# Patient Record
Sex: FEMALE | Race: WHITE
[De-identification: names, ages, dates, MRNs, and addresses within clinical notes are randomized per-mention and may not be internally consistent; named-entity substitution may affect disease eponyms.]

---

## 2017-12-07 ENCOUNTER — HOSPITAL ENCOUNTER (EMERGENCY)
Dept: HOSPITAL 58 - ED | Age: 27
Discharge: HOME | End: 2017-12-07

## 2017-12-07 VITALS — SYSTOLIC BLOOD PRESSURE: 170 MMHG | TEMPERATURE: 98.1 F | DIASTOLIC BLOOD PRESSURE: 98 MMHG

## 2017-12-07 VITALS — BODY MASS INDEX: 30.9 KG/M2

## 2017-12-07 DIAGNOSIS — K29.60: Primary | ICD-10-CM

## 2017-12-07 DIAGNOSIS — F17.210: ICD-10-CM

## 2017-12-07 LAB
ADD URINE MICROSCOPIC: NO
ALBUMIN SERPL-MCNC: 3.4 G/DL (ref 3.4–5)
ALBUMIN/GLOB SERPL: 1.06 {RATIO}
ALP SERPL-CCNC: 65 U/L (ref 42–98)
ALT SERPL-CCNC: 36 U/L (ref 12–78)
AMYLASE SERPL-CCNC: 54 U/L (ref 25–115)
ANION GAP SERPL CALC-SCNC: 15.1 MMOL/L
AST SERPL-CCNC: 23 U/L (ref 15–37)
BASOPHILS # BLD AUTO: 0.1 K/UL (ref 0–0.2)
BASOPHILS NFR BLD AUTO: 0.7 % (ref 0–3)
BILIRUB SERPL-MCNC: 0.2 MG/DL (ref 0–1.2)
BUN SERPL-MCNC: 13 MG/DL (ref 7–18)
BUN/CREAT SERPL: 16.04
CALCIUM SERPL-MCNC: 8.8 MG/DL (ref 8.2–10.2)
CHLORIDE SERPL-SCNC: 108 MMOL/L (ref 98–107)
CO2 BLD-SCNC: 21 MMOL/L (ref 21–32)
CREAT SERPL-MCNC: 0.81 MG/DL (ref 0.6–1.3)
EOSINOPHIL # BLD AUTO: 0.6 K/UL (ref 0–0.7)
EOSINOPHIL NFR BLD AUTO: 6.5 % (ref 0–7)
GFR SERPLBLD BASED ON 1.73 SQ M-ARVRAT: 84 ML/MIN
GLOBULIN SER CALC-MCNC: 3.2 G/L
GLUCOSE SERPL-MCNC: 95 MG/DL (ref 70–110)
H PYLORI IGG SERPL QL IA: NEGATIVE
H.PYLORI INTERNAL QC: (no result)
HCT VFR BLD AUTO: 37.8 % (ref 37–47)
HGB BLD-MCNC: 12.9 G/DL (ref 12–16)
IMM GRANULOCYTES NFR BLD AUTO: 0.2 % (ref 0–5)
LIPASE SERPL-CCNC: 29 U/L (ref 8–78)
LYMPHOCYTES # SPEC AUTO: 1.9 K/UL (ref 0.6–3.4)
LYMPHOCYTES NFR BLD AUTO: 21.2 % (ref 10–50)
MCH RBC QN: 28.5 PG (ref 27–31)
MCHC RBC AUTO-ENTMCNC: 34.1 G/DL (ref 31.8–35.4)
MCV RBC: 83.6 FL (ref 81–99)
MONOCYTES # BLD AUTO: 0.7 K/UL (ref 0.4–2)
MONOCYTES NFR BLD AUTO: 8.1 % (ref 0–10)
NEUTROPHILS # BLD AUTO: 5.8 K/UL (ref 2–6.9)
NEUTROPHILS NFR BLD AUTO: 63.3 %
PH UR: 6 [PH] (ref 5–9)
PLATELET # BLD AUTO: 266 10^3/UL (ref 140–440)
POTASSIUM SERPL-SCNC: 4.1 MMOL/L (ref 3.5–5.1)
PROT SERPL-MCNC: 6.6 G/DL (ref 6.4–8.2)
RBC # BLD AUTO: 4.52 10^6/UL (ref 4.2–5.4)
SERUM PREGNANCY INTERNAL QC: (no result)
SODIUM SERPL-SCNC: 140 MMOL/L (ref 136–145)
SP GR UR: 1.01 (ref 1–1.03)
WBC # BLD AUTO: 9.14 K/UL (ref 4.6–10.2)

## 2017-12-07 PROCEDURE — 99283 EMERGENCY DEPT VISIT LOW MDM: CPT

## 2017-12-07 PROCEDURE — 83690 ASSAY OF LIPASE: CPT

## 2017-12-07 PROCEDURE — 81001 URINALYSIS AUTO W/SCOPE: CPT

## 2017-12-07 PROCEDURE — 80053 COMPREHEN METABOLIC PANEL: CPT

## 2017-12-07 PROCEDURE — 86677 HELICOBACTER PYLORI ANTIBODY: CPT

## 2017-12-07 PROCEDURE — 82150 ASSAY OF AMYLASE: CPT

## 2017-12-07 PROCEDURE — 36415 COLL VENOUS BLD VENIPUNCTURE: CPT

## 2017-12-07 PROCEDURE — 84703 CHORIONIC GONADOTROPIN ASSAY: CPT

## 2017-12-07 PROCEDURE — 85025 COMPLETE CBC W/AUTO DIFF WBC: CPT

## 2017-12-07 NOTE — CT
EXAM:  CT of the abdomen and pelvis without contrast. 

  

HISTORY:  Left upper quadrant abdominal pain. 

  

PROCEDURE:  Contiguous axial CT images of the abdomen and pelvis without contrast with coronal and sa
gittal reformats. 

  

FINDINGS: The liver, gallbladder, pancreas, spleen, adrenal glands and kidneys are normal in appearan
ce. The abdominal aorta is normal in appearance. The visualized loops of bowel and appendix are sarthak
l in appearance.  No free fluid or free air in the abdomen or pelvis. The bladder is adequately fille
d with no abnormality identified. The uterus is unremarkable.  There is a 2.2 cm fluid density cyst i
n the right adnexa. The bones and soft tissues are unremarkable. 

  

Impression: 2.2 cm simple right adnexal cyst.

## 2017-12-07 NOTE — ED.PDOC
General


Stated Complaint: PATIENT STATES THAT SHE HAS HAD ABDOMINAL PAIN IN HER LEFT 

LOWER ABDOMEN. PATIENT REPORTS THAT SHE FEELS THAT THE PAIN IS WORSENING[End]

98.1 101 18 98% 170/98 7/10


Time Seen by Physician: 18:20


Mode of Arrival: Walk-In


Information Source: Patient


Exam Limitations: No limitations


Nursing and Triage Documentation Reviewed and Agree: No





<HUMAILE SARIKA DEGROOT - Last Filed: 17 19:15>





<MOSES WEBER - Last Filed: 17 20:16>


ED Provider: 


Dr. MOSES WBEER





Chief Complaint: Abdominal Pain


Primary Care Provider: 


NAZ BARRIOS








Review of Systems





- Review Of Systems


Constitutional: Reports: No symptoms


Eyes: Reports: No symptoms


Ears, Nose, Mouth, Throat: Reports: No symptoms


Respiratory: Reports: No symptoms


Cardiac: Reports: No symptoms


GI: Reports: No symptoms


: Reports: Burning, Pain (stabbing left upper quadrant consistent with 

stomach pain), Other (guarding)


Musculoskeletal: Reports: No symptoms


Skin: Reports: No symptoms


Neurological: Reports: No symptoms


Endocrine: Reports: No symptoms


Hematologic/Lymphatic: Reports: No symptoms


All Other Systems: Other





<MAILE LUI JR - Last Filed: 17 19:15>





Past Medical History





- Past Medical History


Previously Healthy: Yes


Endocrine: Reports: None


Cardiovascular: Reports: None


Respiratory: Reports: None


Hematological: Reports: None


Gastrointestinal: Reports: None


Genitourinary: Reports: None


Neuro/Psych: Reports: None


Musculoskeletal: Reports: None


Cancer: Reports: None


Last Menstrual Period: 12/15/17





- Surgical History


General Surgical History: Reports: , Unknown





- Family History


Family History: Reports: Unknown





- Social History


Smoking Status: Current every day smoker


Hx Substance Use: No


Alcohol Screening: None





- Immunizations


Tetanus Shot up to Date: No





<MAILE LUI JR - Last Filed: 17 19:15>





Physical Exam





- Physical Exam


Appearance: Well-appearing, Obese


Pain Distress: Moderate


Eyes: BARBER, EOMI, Conjunctiva clear


ENT: Ears normal, Nose normal, Oropharynx normal


Neck: Supple


Respiratory: Airway patent, Breath sounds clear, Breath sounds equal, 

Respirations nonlabored


Cardiovascular: RRR, Pulses normal, No rub, No murmur


GI/: Soft, No masses, Bowel sounds normal, No Organomegaly, Tender


Musculoskeletal: Normal strength, ROM intact, No edema, No calf tenderness


Skin: Warm, Dry, Normal color


Neurological: Sensation intact, Motor intact, Reflexes intact, Cranial nerves 

intact, Alert, Oriented


Psychiatric: Affect appropriate, Mood appropriate





<LUIMAILE SARIKA DEGROOT - Last Filed: 17 19:15>





Physician Notification





- Case Discussed


Endorsed To/Discussed With: DR WEBER


Time of Discussion: 19:09 (LABS PENDING)





<MAILE LUI JR - Last Filed: 17 19:15>





Critical Care Note





- Critical Care Note


Total Time (mins): 0





<MAILE LUI JR - Last Filed: 17 19:15>





Course





- Course


Hematology/Chemistry: 


 17 18:35





 17 18:35





<MAILE LUI JR - Last Filed: 17 19:15>





- Course


Hematology/Chemistry: 


 17 18:35





 17 18:35





<MOSES WEBER - Last Filed: 17 20:16>





- Course


Orders, Labs, Meds: 


Lab Review











  17





  18:35 18:35 18:35


 


WBC  9.14  


 


RBC  4.52  


 


Hgb  12.9  


 


Hct  37.8  


 


MCV  83.6  


 


MCH  28.5  


 


MCHC  34.1  


 


RDW Coeff of Chao  14.2  


 


Plt Count  266  


 


Immature Gran % (Auto)  0.2  


 


Neut % (Auto)  63.3  


 


Lymph % (Auto)  21.2  


 


Mono % (Auto)  8.1  


 


Eos % (Auto)  6.5  


 


Baso % (Auto)  0.7  


 


Immature Gran # (Auto)  0.0  


 


Neut #  5.8  


 


Lymph #  1.9  


 


Mono #  0.7  


 


Eos #  0.6  


 


Baso #  0.1  


 


Sodium   140 


 


Potassium   4.1 


 


Chloride   108 H 


 


Carbon Dioxide   21 


 


Anion Gap   15.1 


 


BUN   13 


 


Creatinine   0.81 


 


Estimated GFR (MDRD)   84.00 


 


BUN/Creatinine Ratio   16.04 


 


Glucose   95 


 


Calcium   8.8 


 


Total Bilirubin   0.20 


 


AST   23 


 


ALT   36 


 


Alkaline Phosphatase   65 


 


Total Protein   6.6 


 


Albumin   3.4 


 


Globulin   3.2 


 


Albumin/Globulin Ratio   1.06 


 


Amylase   54 


 


Lipase   29 


 


Serum Pregnancy, Qual   


 


Urine Color   


 


Urine Clarity   


 


Urine pH   


 


Ur Specific Gravity   


 


Urine Protein   


 


Urine Glucose (UA)   


 


Urine Ketones   


 


Urine Blood   


 


Urine Nitrite   


 


Urine Bilirubin   


 


Urine Urobilinogen   


 


Ur Leukocyte Esterase   


 


H. pylori IgG Antibody    Negative














  17





  18:35 18:40


 


WBC  


 


RBC  


 


Hgb  


 


Hct  


 


MCV  


 


MCH  


 


MCHC  


 


RDW Coeff of Chao  


 


Plt Count  


 


Immature Gran % (Auto)  


 


Neut % (Auto)  


 


Lymph % (Auto)  


 


Mono % (Auto)  


 


Eos % (Auto)  


 


Baso % (Auto)  


 


Immature Gran # (Auto)  


 


Neut #  


 


Lymph #  


 


Mono #  


 


Eos #  


 


Baso #  


 


Sodium  


 


Potassium  


 


Chloride  


 


Carbon Dioxide  


 


Anion Gap  


 


BUN  


 


Creatinine  


 


Estimated GFR (MDRD)  


 


BUN/Creatinine Ratio  


 


Glucose  


 


Calcium  


 


Total Bilirubin  


 


AST  


 


ALT  


 


Alkaline Phosphatase  


 


Total Protein  


 


Albumin  


 


Globulin  


 


Albumin/Globulin Ratio  


 


Amylase  


 


Lipase  


 


Serum Pregnancy, Qual  Negative 


 


Urine Color   Yellow


 


Urine Clarity   Clear


 


Urine pH   6.0


 


Ur Specific Gravity   1.015


 


Urine Protein   Negative


 


Urine Glucose (UA)   Negative


 


Urine Ketones   Negative


 


Urine Blood   Negative


 


Urine Nitrite   Negative


 


Urine Bilirubin   Negative


 


Urine Urobilinogen   0.2


 


Ur Leukocyte Esterase   Negative


 


H. pylori IgG Antibody  








Orders











 Category Date Time Status


 


 AMYLASE Stat LAB  17 18:35 Completed


 


 CBC W/ AUTO DIFF Stat LAB  17 18:35 Completed


 


 COMPREHENSIVE METABOLIC PANEL Stat LAB  17 18:35 Completed


 


 H. PYLORI SCREEN Stat LAB  17 18:35 Completed


 


 LIPASE Stat LAB  17 18:35 Completed


 


 PREGNANCY SERUM TEST [SERUM PREGNANCY] Stat LAB  17 18:35 Completed


 


 URINALYSIS C & S IF INDICATED Stat LAB  17 18:40 Completed


 


 Pantoprazole Sodium [Protonix] MEDS  17 18:59 Discontinued





 40 mg PO ONCE STA   


 


 CT ABDOMEN/PELVIS WO CONTRAST Stat RADS  17 18:24 Completed








Medications














Discontinued Medications














Generic Name Dose Route Start Last Admin





  Trade Name Freq  PRN Reason Stop Dose Admin


 


Pantoprazole Sodium  40 mg  17 18:59  17 19:07





  Protonix  PO  17 19:00  40 mg





  ONCE STA   Administration











Vital Signs: 


 











  Temp Pulse Resp BP Pulse Ox


 


 17 17:53  98.1 F  101 H  18  170/98 H  98














Departure





<MAILE LUI JR - Last Filed: 17 19:15>





- Departure


Time of Disposition: 19:36


Pt referred to PMD for follow-up: Yes





<MOSES WEBER - Last Filed: 17 20:16>





- Departure


Disposition: HOME SELF-CARE


Discharge Problem: 


 Peptic gastritis





Instructions:  Gastritis (ED)


Condition: Fair


Additional Instructions: 


follow up with PMD 1 week discuss symptoms and protonix


return if worse


protonix for 4 weeks if OK with PMD discuss other adjuncts such as carafate and 

maalox with your physician


Return to do an US and HIDA of your gall bladder in the morning. 


Prescriptions: 


Pantoprazole Sodium [Protonix] 40 mg PO QDAC #30 tablet.


Allergies/Adverse Reactions: 


Allergies





No Known Allergies Allergy (Verified 17 17:58)


 








Home Medications: 


Ambulatory Orders





Citalopram Hydrobromide [Celexa] 40 mg PO DAILY 14 


Clonazepam [Klonopin] 0.5 mg PO BID 16 


Pantoprazole Sodium [Protonix] 40 mg PO QDAC #30 tablet. 17

## 2018-02-01 ENCOUNTER — HOSPITAL ENCOUNTER (EMERGENCY)
Dept: HOSPITAL 58 - ED | Age: 28
Discharge: HOME | End: 2018-02-01

## 2018-02-01 VITALS — DIASTOLIC BLOOD PRESSURE: 79 MMHG | TEMPERATURE: 97.7 F | SYSTOLIC BLOOD PRESSURE: 128 MMHG

## 2018-02-01 VITALS — BODY MASS INDEX: 31.7 KG/M2
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
## 2018-02-01 DIAGNOSIS — S60.00XA: ICD-10-CM

## 2018-02-01 DIAGNOSIS — S60.032A: Primary | ICD-10-CM

## 2018-02-01 DIAGNOSIS — F17.210: ICD-10-CM

## 2018-02-01 DIAGNOSIS — S60.222A: ICD-10-CM

## 2018-02-01 PROCEDURE — 99283 EMERGENCY DEPT VISIT LOW MDM: CPT
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
## 2018-02-01 NOTE — DI
Exam:  Three x-rays of the left hand. 

  

Comparison:  None available. 

  

Reason for exam:  Hand injury.  Blunt trauma. 

  

FINDINGS:  No acute fracture or malalignment.  The joint spaces are well maintained.  No unexplained 
calcific soft tissue density or radiopaque retained foreign body. 

  

Impression:  No acute fracture or malalignment is seen in the left hand.

## 2019-08-28 ENCOUNTER — HOSPITAL ENCOUNTER (EMERGENCY)
Dept: HOSPITAL 58 - ED | Age: 29
Discharge: LEFT BEFORE DISCHARGE | End: 2019-08-28

## 2019-08-28 VITALS — BODY MASS INDEX: 25.6 KG/M2

## 2019-08-28 VITALS — SYSTOLIC BLOOD PRESSURE: 129 MMHG | TEMPERATURE: 99.2 F | DIASTOLIC BLOOD PRESSURE: 86 MMHG

## 2019-08-28 DIAGNOSIS — N39.0: Primary | ICD-10-CM

## 2019-08-28 DIAGNOSIS — F17.210: ICD-10-CM

## 2019-08-28 DIAGNOSIS — R10.84: ICD-10-CM

## 2019-08-28 PROCEDURE — 93010 ELECTROCARDIOGRAM REPORT: CPT

## 2019-08-28 PROCEDURE — 80053 COMPREHEN METABOLIC PANEL: CPT

## 2019-08-28 PROCEDURE — 87502 INFLUENZA DNA AMP PROBE: CPT

## 2019-08-28 PROCEDURE — 81001 URINALYSIS AUTO W/SCOPE: CPT

## 2019-08-28 PROCEDURE — 87086 URINE CULTURE/COLONY COUNT: CPT

## 2019-08-28 PROCEDURE — 93005 ELECTROCARDIOGRAM TRACING: CPT

## 2019-08-28 PROCEDURE — 87651 STREP A DNA AMP PROBE: CPT

## 2019-08-28 PROCEDURE — 96372 THER/PROPH/DIAG INJ SC/IM: CPT

## 2019-08-28 PROCEDURE — 85025 COMPLETE CBC W/AUTO DIFF WBC: CPT

## 2019-08-28 PROCEDURE — 36415 COLL VENOUS BLD VENIPUNCTURE: CPT

## 2019-08-28 PROCEDURE — 99284 EMERGENCY DEPT VISIT MOD MDM: CPT

## 2019-08-28 PROCEDURE — 85008 BL SMEAR W/O DIFF WBC COUNT: CPT

## 2019-08-28 PROCEDURE — 84703 CHORIONIC GONADOTROPIN ASSAY: CPT

## 2019-08-28 NOTE — CT
Exam:  CT abdomen pelvis without intravenous contrast. 

  

Comparison:  12/07/2017. 

  

Reason for exam:  Pain. 

  

  

FINDINGS:  No pleural effusion, or focal consolidation in the partially imaged lung bases. 

  

The spleen, adrenal glands, gallbladder, and pancreas appear grossly unremarkable within limitations 
of a noncontrasted study. 

  

No hydronephrosis, hydroureter or nephrolithiasis in either kidney. 

  

The appendix appears unremarkable. 

  

No evidence of bowel obstruction. 

  

No intra-abdominal free air or pelvic free fluid. 

  

The bladder appears grossly unremarkable. 

  

No suspicious appearing osteoblastic or osteolytic lesion. 

  

Impression: 

  

No acute inflammatory findings are seen within the abdomen or pelvis.

## 2019-08-28 NOTE — DI
EXAM:  Two views of the chest. 

  

History:  Cough. 

  

Findings:  Heart size is normal.  Central peribronchial cuffing.  No appreciable pleural fluid and no
 pneumothorax.  No acute osseous abnormalities. 

  

Impression:  Central peribronchial wall thickening but no consolidated pneumonia.

## 2021-11-22 NOTE — ED.PDOC
General


ED Provider: 


Dr. JITENDRA PACE





Chief Complaint: Fever


Stated Complaint: fever , cough abdominal pain nausea, vomiting weakness


Time Seen by Physician: 09:00 ([facility]UIZ AND NURSES )


Mode of Arrival: Walk-In


Information Source: Patient


Exam Limitations: No limitations


Primary Care Provider: 


NAZ BARRIOS





Nursing and Triage Documentation Reviewed and Agree: Yes


Does patient meet sepsis criteria?: No


System Inflammatory Response Syndrome: Pulse >90 BPM, Not Applicable


Sepsis Protocol: 


For patient's 13 years and over:





Temp is 96.8 and below  and greater


Pulse >90 BPM


Resp >20/minute


Acutely Altered Mental Status





Are patient's symptoms suggestive of a new infection, such as:


   -Pneumonia


   -Skin, Soft Tissue


   -Endocarditis


   -UTI


   -Bone, Joint Infection


   -Implantable Device


   -Acute Abdominal Infection


   -Wound Infection


   -Meningitis


   -Blood Stream Catheter Infection


   -Unknown








GI Complaint Exam





- Abdominal Pain Complaint/Exam


Onset: Gradual


Duration: 2 DAYS


Symptoms Are: Resolved


Timing: Intermittent


Initial Severity: Mild


Current Severity: Mild


Location of Pain: Diffuse


Character: Reports: Dull


Aggravating: Reports: None


Alleviating: Reports: None


Associated Signs and Symptoms: Reports: Fever, Cough, Dysuria.  Denies: 

Diaphoresis, Chest pain, Dizziness, Back pain, Constipation, Blood in stool, 

Urinary frequency, Decreased urine output, Decreased appetite, Vaginal bleeding

, Vaginal discharge, Nausea, Vomiting, Diarrhea, Sore throat, Decreased activity


AAA Risk Factors: Reports: None


Cardiac Risk Factors: Reports: None


Ectopic Pregnancy Risk Factors: Reports: None


Ovarian Torsion Risk Factors: Reports: None


Surgical Obstruction Risk Factors: Reports: None


Related Surgical History: Reports: None


Patient Rh Status: Unknown


Abdominal Findings: Present: None


Differential Diagnoses: Appendicitis, Bowel Obstruction, Constipation, 

Gastroenteritis, Pancreatitis, Ureteral Stone, UTI





Review of Systems





- Review Of Systems


Constitutional: Reports: No symptoms


Eyes: Reports: No symptoms


Ears, Nose, Mouth, Throat: Reports: No symptoms


Respiratory: Reports: No symptoms


Cardiac: Reports: No symptoms


GI: Reports: Abdominal pain, Nausea, Poor appetite


: Reports: No symptoms


Musculoskeletal: Reports: No symptoms


Skin: Reports: No symptoms


Neurological: Reports: No symptoms


Endocrine: Reports: No symptoms


Hematologic/Lymphatic: Reports: No symptoms


All Other Systems: Reviewed and Negative





Past Medical History





- Past Medical History


Previously Healthy: Yes


Endocrine: Reports: None


Cardiovascular: Reports: None


Respiratory: Reports: None


Hematological: Reports: None


Gastrointestinal: Reports: None


Genitourinary: Reports: None


Neuro/Psych: Reports: None


Musculoskeletal: Reports: None


Cancer: Reports: None


Last Menstrual Period: today





- Surgical History


General Surgical History: Reports: , Unknown





- Family History


Family History: Reports: Unknown





- Social History


Smoking Status: Current every day smoker, Heavy tobacco smoker


Hx Substance Use: No


Alcohol Screening: None





Physical Exam





- Physical Exam


Appearance: Well-appearing, No pain distress, Well-nourished


Eyes: BARBER, EOMI, Conjunctiva clear


ENT: Ears normal, Nose normal, Oropharynx normal


Respiratory: Airway patent, Breath sounds clear, Breath sounds equal, 

Respirations nonlabored


Cardiovascular: RRR, Pulses normal, No rub, No murmur


GI/: Soft, Nontender, No masses, Bowel sounds normal, No Organomegaly


Musculoskeletal: Normal strength, ROM intact, No edema, No calf tenderness


Skin: Warm, Dry, Normal color


Neurological: Sensation intact, Motor intact, Reflexes intact, Cranial nerves 

intact, Alert, Oriented


Psychiatric: Affect appropriate, Mood appropriate





Critical Care Note





- Critical Care Note


Total Time (mins): 0





Course





- Course


Hematology/Chemistry: 


 19 09:17





 19 09:17


Orders, Labs, Meds: 


Lab Review











  19





  09:05 09:15 09:17


 


WBC    7.34


 


RBC    5.21


 


Hgb    11.6 L


 


Hct    37.9


 


MCV    72.7 L


 


MCH    22.3 L


 


MCHC    30.6 L


 


RDW Coeff of Chao    21.2 H


 


Plt Count    302


 


Immature Gran % (Auto)    0.3


 


Neut % (Auto)    64.1


 


Lymph % (Auto)    26.7


 


Mono % (Auto)    6.1


 


Eos % (Auto)    2.3


 


Baso % (Auto)    0.5


 


Immature Gran # (Auto)    0.0


 


Neut # (Auto)    4.7


 


Lymph # (Auto)    2.0


 


Mono # (Auto)    0.5


 


Eos # (Auto)    0.2


 


Baso # (Auto)    0.0


 


Hypochromasia    1+


 


Anisocytosis    1+


 


Sodium   


 


Potassium   


 


Chloride   


 


Carbon Dioxide   


 


Anion Gap   


 


BUN   


 


Creatinine   


 


Estimated GFR (MDRD)   


 


BUN/Creatinine Ratio   


 


Glucose   


 


Calcium   


 


Total Bilirubin   


 


AST   


 


ALT   


 


Alkaline Phosphatase   


 


Total Protein   


 


Albumin   


 


Globulin   


 


Albumin/Globulin Ratio   


 


Serum Pregnancy, Qual   


 


Urine Color   Yellow 


 


Urine Clarity   Cloudy 


 


Urine pH   5.5 


 


Ur Specific Gravity   >=1.030 


 


Urine Protein   Trace 


 


Urine Glucose (UA)   Negative 


 


Urine Ketones   Trace 


 


Urine Blood   3+ 


 


Urine Nitrite   Negative 


 


Urine Bilirubin   1+ 


 


Urine Urobilinogen   0.2 


 


Ur Leukocyte Esterase   Negative 


 


Urine Microscopic RBC   2-5 


 


Urine Microscopic WBC   0-2 


 


Ur Squamous Epith Cells   5-10 


 


Urine Bacteria   1+ 


 


Influ A Molecular Assay  Negative by naat  


 


Influ B Molecular Assay  Negative by naat  














  19





  09:17 09:17


 


WBC  


 


RBC  


 


Hgb  


 


Hct  


 


MCV  


 


MCH  


 


MCHC  


 


RDW Coeff of Chao  


 


Plt Count  


 


Immature Gran % (Auto)  


 


Neut % (Auto)  


 


Lymph % (Auto)  


 


Mono % (Auto)  


 


Eos % (Auto)  


 


Baso % (Auto)  


 


Immature Gran # (Auto)  


 


Neut # (Auto)  


 


Lymph # (Auto)  


 


Mono # (Auto)  


 


Eos # (Auto)  


 


Baso # (Auto)  


 


Hypochromasia  


 


Anisocytosis  


 


Sodium  138.1 


 


Potassium  4.34 


 


Chloride  110.4 H 


 


Carbon Dioxide  20.5 L 


 


Anion Gap  11.54 


 


BUN  12.0 


 


Creatinine  0.90 


 


Estimated GFR (MDRD)  74.00 


 


BUN/Creatinine Ratio  13.33 


 


Glucose  97.4 


 


Calcium  9.04 


 


Total Bilirubin  0.33 


 


AST  17.7 


 


ALT  11.0 


 


Alkaline Phosphatase  83.5 


 


Total Protein  7.26 


 


Albumin  4.25 


 


Globulin  3.01 


 


Albumin/Globulin Ratio  1.41 


 


Serum Pregnancy, Qual   Negative


 


Urine Color  


 


Urine Clarity  


 


Urine pH  


 


Ur Specific Gravity  


 


Urine Protein  


 


Urine Glucose (UA)  


 


Urine Ketones  


 


Urine Blood  


 


Urine Nitrite  


 


Urine Bilirubin  


 


Urine Urobilinogen  


 


Ur Leukocyte Esterase  


 


Urine Microscopic RBC  


 


Urine Microscopic WBC  


 


Ur Squamous Epith Cells  


 


Urine Bacteria  


 


Influ A Molecular Assay  


 


Influ B Molecular Assay  








Orders











 Category Date Time Status


 


 EKG-(ED ONLY) Stat CARDIO  19 09:12 Completed


 


 CBC W/ AUTO DIFF Stat LAB  19 09:17 Completed


 


 COMPREHENSIVE METABOLIC PANEL Stat LAB  19 09:17 Completed


 


 FLU A/B MOLECULAR Stat LAB  19 09:05 Completed


 


 RAPID STREP SCREEN [MOLECULAR GROUP A STREP] Stat LAB  19 09:05 Completed


 


 RBC MORPHOLOGY Stat LAB  19 09:17 Completed


 


 SERUM PREGNANCY Stat LAB  19 09:17 Completed


 


 UA [URINALYSIS C & S IF INDICATED] Stat LAB  19 09:15 Completed


 


 URINE CULTURE Routine LAB  19 09:15 Received


 


 Ceftriaxone 1 gm Vial [Rocephin 1 gm Vial] MEDS  19 09:46 Discontinued





 1 gm IM ONCE STA   


 


 Lidocaine HCl/Pf [Lidocaine HCl 1% Sdv] MEDS  19 09:46 Discontinued





 2.1 ml IM ONCE STA   


 


 CHEST, 2 VIEWS PA & LAT Stat RADS  19 09:12 Completed


 


 CT ABDOMEN/PELVIS WO CONTRAST Stat RADS  19 10:46 Taken








Medications














Discontinued Medications














Generic Name Dose Route Start Last Admin





  Trade Name Freq  PRN Reason Stop Dose Admin


 


Ceftriaxone Sodium  1 gm  19 09:46  19 10:08





  Rocephin 1 Gm Vial  IM  19 09:47  1 gm





  ONCE STA   Administration





     





     





     





     


 


Lidocaine HCl  2.1 ml  19 09:46  19 10:08





  Lidocaine Hcl 1% Sdv  IM  19 09:47  2.1 ml





  ONCE STA   Administration





     





     





     





     











Vital Signs: 


 











  Temp Pulse Resp BP Pulse Ox


 


 19 08:52  99.2 F  97 H  16  129/86  94 L














Departure





- Departure


Time of Disposition: 11:13 (DISCUSSED CT ABDOMEN WITH THE CT TECH MALATHI . PT  

DID NOT WAIT FOR CT ABDOMEN  LEFT AMA)


Disposition: AMA


Discharge Problem: 


UTI (urinary tract infection)


Qualifiers:


 Urinary tract infection type: site unspecified Hematuria presence: without 

hematuria Qualified Code(s): N39.0 - Urinary tract infection, site not specified





Abdominal pain


Qualifiers:


 Abdominal location: generalized Qualified Code(s): R10.84 - Generalized 

abdominal pain





Instructions:  Urinary Tract Infection in Women (ED), Abdominal Pain (ED)


Condition: Good


Pt referred to PMD for follow-up: Yes


IPMP verified?: No


Additional Instructions: 


Please call your Family Physician as soon as possible to schedule a follow-up 

appointment.


Prescriptions: 


Sulfamethoxazole/Trimethoprim [Bactrim Ds 800/160 mg] 1 tab PO Q12HR 8 Days  

tablet


Allergies/Adverse Reactions: 


Allergies





No Known Allergies Allergy (Verified 19 09:00)


 








Home Medications: 


Ambulatory Orders





Citalopram Hydrobromide [Celexa] 40 mg PO DAILY 14 


Clonazepam [Klonopin] 0.5 mg PO BID PRN 16 


Sulfamethoxazole/Trimethoprim [Bactrim Ds 800/160 mg] 1 tab PO Q12HR 8 Days  

tablet 19 you may shower in 48 hours/Shower only